# Patient Record
Sex: FEMALE | Race: BLACK OR AFRICAN AMERICAN | NOT HISPANIC OR LATINO | URBAN - METROPOLITAN AREA
[De-identification: names, ages, dates, MRNs, and addresses within clinical notes are randomized per-mention and may not be internally consistent; named-entity substitution may affect disease eponyms.]

---

## 2023-09-14 ENCOUNTER — OFFICE VISIT (OUTPATIENT)
Dept: URBAN - METROPOLITAN AREA CLINIC 70 | Facility: CLINIC | Age: 68
End: 2023-09-14
Payer: COMMERCIAL

## 2023-09-14 ENCOUNTER — LAB OUTSIDE AN ENCOUNTER (OUTPATIENT)
Dept: URBAN - METROPOLITAN AREA CLINIC 70 | Facility: CLINIC | Age: 68
End: 2023-09-14

## 2023-09-14 VITALS
DIASTOLIC BLOOD PRESSURE: 83 MMHG | BODY MASS INDEX: 29.04 KG/M2 | OXYGEN SATURATION: 97 % | WEIGHT: 157.8 LBS | SYSTOLIC BLOOD PRESSURE: 145 MMHG | HEIGHT: 62 IN | HEART RATE: 98 BPM | TEMPERATURE: 97.3 F

## 2023-09-14 DIAGNOSIS — R19.5 POSITIVE FECAL OCCULT BLOOD TEST: ICD-10-CM

## 2023-09-14 DIAGNOSIS — R19.8 CHANGE IN BOWEL MOVEMENT: ICD-10-CM

## 2023-09-14 PROCEDURE — 99204 OFFICE O/P NEW MOD 45 MIN: CPT

## 2023-09-14 RX ORDER — AMLODIPINE BESYLATE 5 MG/1
1 TABLET TABLET ORAL ONCE A DAY
Status: ACTIVE | COMMUNITY

## 2023-09-14 RX ORDER — BACILLUS COAGULANS/LACTASE 500MM-3000
AS DIRECTED CAPSULE ORAL
Status: ACTIVE | COMMUNITY

## 2023-09-14 RX ORDER — IRON FUM,PS CMP/VIT C/NIACIN 125-40-3MG
1 CAPSULE BETWEEN MEALS CAPSULE ORAL ONCE A DAY
Status: ACTIVE | COMMUNITY

## 2023-09-14 RX ORDER — DICLOFENAC SODIUM 75 MG/1
1 TABLET AS NEEDED TABLET, DELAYED RELEASE ORAL TWICE A DAY
Status: ACTIVE | COMMUNITY

## 2023-09-14 RX ORDER — ATORVASTATIN CALCIUM 40 MG/1
1 TABLET TABLET, FILM COATED ORAL ONCE A DAY
Status: ACTIVE | COMMUNITY

## 2023-09-14 RX ORDER — CHOLECALCIFEROL (VITAMIN D3) 50 MCG
1 TABLET TABLET ORAL ONCE A DAY
Status: ACTIVE | COMMUNITY

## 2023-09-14 RX ORDER — LISINOPRIL AND HYDROCHLOROTHIAZIDE 20; 25 MG/1; MG/1
1 TABLET TABLET ORAL ONCE A DAY
Status: ACTIVE | COMMUNITY

## 2023-09-14 RX ORDER — OMEPRAZOLE 20 MG/1
1 CAPSULE 30 MINUTES BEFORE MORNING MEAL CAPSULE, DELAYED RELEASE ORAL ONCE A DAY
Status: ACTIVE | COMMUNITY

## 2023-09-14 NOTE — HPI-TODAY'S VISIT:
The pt presents for a positive fecal occult test and changes in bowel movements.  She sttates that recently she started experiencing loose stools and fecal incontinence.  She has a hx of urinary incontinence as well.  She went to her PCP and they ordered a hemoccult.  Results were positive.  Hgb and Hct are normal. She denies rectal bleeding but admits to dark stools.  She denies abdominal pain or weight loss. Last colonoscopy was about 10 years ago.

## 2023-09-20 ENCOUNTER — OFFICE VISIT (OUTPATIENT)
Dept: URBAN - METROPOLITAN AREA SURGERY CENTER 24 | Facility: SURGERY CENTER | Age: 68
End: 2023-09-20
Payer: COMMERCIAL

## 2023-09-20 ENCOUNTER — CLAIMS CREATED FROM THE CLAIM WINDOW (OUTPATIENT)
Dept: URBAN - METROPOLITAN AREA CLINIC 4 | Facility: CLINIC | Age: 68
End: 2023-09-20
Payer: COMMERCIAL

## 2023-09-20 DIAGNOSIS — K29.70 GASTRITIS: ICD-10-CM

## 2023-09-20 DIAGNOSIS — K29.70 GASTRITIS, UNSPECIFIED, WITHOUT BLEEDING: ICD-10-CM

## 2023-09-20 DIAGNOSIS — R19.5 ABNORMAL CONSISTENCY OF STOOL: ICD-10-CM

## 2023-09-20 DIAGNOSIS — K63.89 MELANOSIS OF COLON: ICD-10-CM

## 2023-09-20 DIAGNOSIS — K44.9 HIATAL HERNIA: ICD-10-CM

## 2023-09-20 DIAGNOSIS — K31.89 ACHYLIA: ICD-10-CM

## 2023-09-20 DIAGNOSIS — K63.89 APPENDICITIS EPIPLOICA: ICD-10-CM

## 2023-09-20 DIAGNOSIS — R19.5 HEME POSITIVE STOOL: ICD-10-CM

## 2023-09-20 PROCEDURE — 45380 COLONOSCOPY AND BIOPSY: CPT | Performed by: INTERNAL MEDICINE

## 2023-09-20 PROCEDURE — 88312 SPECIAL STAINS GROUP 1: CPT | Performed by: PATHOLOGY

## 2023-09-20 PROCEDURE — 88305 TISSUE EXAM BY PATHOLOGIST: CPT | Performed by: PATHOLOGY

## 2023-09-20 PROCEDURE — G8907 PT DOC NO EVENTS ON DISCHARG: HCPCS | Performed by: INTERNAL MEDICINE

## 2023-09-20 PROCEDURE — 00813 ANES UPR LWR GI NDSC PX: CPT | Performed by: NURSE ANESTHETIST, CERTIFIED REGISTERED

## 2023-09-20 PROCEDURE — 43239 EGD BIOPSY SINGLE/MULTIPLE: CPT | Performed by: INTERNAL MEDICINE

## 2023-10-05 ENCOUNTER — TELEPHONE ENCOUNTER (OUTPATIENT)
Dept: URBAN - METROPOLITAN AREA CLINIC 70 | Facility: CLINIC | Age: 68
End: 2023-10-05

## 2023-10-18 ENCOUNTER — DASHBOARD ENCOUNTERS (OUTPATIENT)
Age: 68
End: 2023-10-18

## 2023-10-18 ENCOUNTER — OFFICE VISIT (OUTPATIENT)
Dept: URBAN - METROPOLITAN AREA CLINIC 70 | Facility: CLINIC | Age: 68
End: 2023-10-18
Payer: COMMERCIAL

## 2023-10-18 VITALS
DIASTOLIC BLOOD PRESSURE: 82 MMHG | TEMPERATURE: 97.2 F | HEIGHT: 62 IN | HEART RATE: 108 BPM | WEIGHT: 158.6 LBS | BODY MASS INDEX: 29.19 KG/M2 | SYSTOLIC BLOOD PRESSURE: 134 MMHG

## 2023-10-18 DIAGNOSIS — R15.9 INCONTINENCE OF FECES, UNSPECIFIED FECAL INCONTINENCE TYPE: ICD-10-CM

## 2023-10-18 PROBLEM — 88111009: Status: ACTIVE | Noted: 2023-10-18

## 2023-10-18 PROBLEM — 35064005: Status: ACTIVE | Noted: 2023-10-18

## 2023-10-18 PROBLEM — 59614000: Status: ACTIVE | Noted: 2023-10-18

## 2023-10-18 PROBLEM — 72042002: Status: ACTIVE | Noted: 2023-10-18

## 2023-10-18 PROCEDURE — 99213 OFFICE O/P EST LOW 20 MIN: CPT | Performed by: INTERNAL MEDICINE

## 2023-10-18 RX ORDER — BACILLUS COAGULANS/LACTASE 500MM-3000
AS DIRECTED CAPSULE ORAL
Status: ACTIVE | COMMUNITY

## 2023-10-18 RX ORDER — OMEPRAZOLE 20 MG/1
1 CAPSULE 30 MINUTES BEFORE MORNING MEAL CAPSULE, DELAYED RELEASE ORAL ONCE A DAY
Status: ACTIVE | COMMUNITY

## 2023-10-18 RX ORDER — DICLOFENAC SODIUM 75 MG/1
1 TABLET AS NEEDED TABLET, DELAYED RELEASE ORAL TWICE A DAY
Status: ACTIVE | COMMUNITY

## 2023-10-18 RX ORDER — LISINOPRIL AND HYDROCHLOROTHIAZIDE 20; 25 MG/1; MG/1
1 TABLET TABLET ORAL ONCE A DAY
Status: ACTIVE | COMMUNITY

## 2023-10-18 RX ORDER — IRON FUM,PS CMP/VIT C/NIACIN 125-40-3MG
1 CAPSULE BETWEEN MEALS CAPSULE ORAL ONCE A DAY
Status: ACTIVE | COMMUNITY

## 2023-10-18 RX ORDER — ATORVASTATIN CALCIUM 40 MG/1
1 TABLET TABLET, FILM COATED ORAL ONCE A DAY
Status: ACTIVE | COMMUNITY

## 2023-10-18 RX ORDER — AMLODIPINE BESYLATE 5 MG/1
1 TABLET TABLET ORAL ONCE A DAY
Status: ACTIVE | COMMUNITY

## 2023-10-18 RX ORDER — CHOLECALCIFEROL (VITAMIN D3) 50 MCG
1 TABLET TABLET ORAL ONCE A DAY
Status: ACTIVE | COMMUNITY

## 2023-10-18 NOTE — HPI-TODAY'S VISIT:
68-year-old female here for follow-up.  Previously seen with complaint of loose stools and FI.  Patient subsequently underwent EGD and colonoscopy in September 2023. EGD showed normal esophagus, small hiatal hernia, gastritis status post biopsy, normal examined duodenum.  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  Colonoscopy September 2023 showed melanosis colonic, scattered diverticula in sigmoid and ascending colon, exam was otherwise unremarkable.  Random colon biopsy showed melanosis coli. Patient has failed trial of fiber for FI. At today's visit, denies diarrhea or loose stool, continues to experienced incontinence and frustrated about it.